# Patient Record
Sex: FEMALE | NOT HISPANIC OR LATINO | URBAN - NONMETROPOLITAN AREA
[De-identification: names, ages, dates, MRNs, and addresses within clinical notes are randomized per-mention and may not be internally consistent; named-entity substitution may affect disease eponyms.]

---

## 2019-07-10 ENCOUNTER — OFFICE VISIT (OUTPATIENT)
Dept: CARDIOLOGY | Facility: PHYSICIAN GROUP | Age: 74
End: 2019-07-10
Payer: COMMERCIAL

## 2019-07-10 VITALS
OXYGEN SATURATION: 99 % | HEIGHT: 61 IN | DIASTOLIC BLOOD PRESSURE: 82 MMHG | BODY MASS INDEX: 19.63 KG/M2 | HEART RATE: 106 BPM | SYSTOLIC BLOOD PRESSURE: 126 MMHG | WEIGHT: 104 LBS

## 2019-07-10 DIAGNOSIS — I48.19 PERSISTENT ATRIAL FIBRILLATION (HCC): ICD-10-CM

## 2019-07-10 DIAGNOSIS — I15.0 RENOVASCULAR HYPERTENSION: ICD-10-CM

## 2019-07-10 DIAGNOSIS — I63.10 CEREBROVASCULAR ACCIDENT (CVA) DUE TO EMBOLISM OF PRECEREBRAL ARTERY (HCC): ICD-10-CM

## 2019-07-10 DIAGNOSIS — Z79.01 ANTICOAGULATED: ICD-10-CM

## 2019-07-10 PROBLEM — I48.91 AF (ATRIAL FIBRILLATION) (HCC): Status: ACTIVE | Noted: 2019-07-10

## 2019-07-10 PROCEDURE — 99204 OFFICE O/P NEW MOD 45 MIN: CPT | Performed by: INTERNAL MEDICINE

## 2019-07-10 RX ORDER — DILTIAZEM HYDROCHLORIDE 180 MG/1
CAPSULE, EXTENDED RELEASE ORAL
COMMUNITY
Start: 2019-06-17

## 2019-07-10 RX ORDER — RIVAROXABAN 20 MG/1
TABLET, FILM COATED ORAL
COMMUNITY
Start: 2019-06-11

## 2019-07-10 ASSESSMENT — ENCOUNTER SYMPTOMS
INSOMNIA: 0
HEADACHES: 1
SHORTNESS OF BREATH: 0
DIZZINESS: 0
NERVOUS/ANXIOUS: 0
TREMORS: 0
MYALGIAS: 0
WHEEZING: 0
COUGH: 0
SPEECH CHANGE: 1
EYE PAIN: 0
VOMITING: 0
LOSS OF CONSCIOUSNESS: 0
ABDOMINAL PAIN: 0
TINGLING: 0
CHILLS: 0
NAUSEA: 0
FEVER: 0
BRUISES/BLEEDS EASILY: 0
DEPRESSION: 0
BLURRED VISION: 0
SENSORY CHANGE: 0
PALPITATIONS: 0
EYE DISCHARGE: 0

## 2019-07-10 NOTE — LETTER
[facility] [address]  Phone: [phone]  Fax: [phone]              Azeb Childers  1945    Encounter Date: 7/10/2019    Essie Jain M.D.          PROGRESS NOTE:  Chief Complaint   Patient presents with   • Hypertension       Subjective:   Azeb Childers is a 74 y.o. female who presents today as a new patient.  She is sent in consultation in regards to her permanent atrial fibrillation and hypertension by her primary doctor, Dr. Sorto.    She is accompanied by her .  She is moved from [place] to be closer to her  who works in [place] at the [employer] as an .  They are very excited to be her together    Last year she had a stroke.  At this time it was realized that she had high blood pressure and atrial fibrillation.  She states she did not have a very good relationship with her prior doctor and is not sure if he had told her she had atrial fibrillation before.  In any event they think that sometimes her stroke has caused difficulty with her memory and number finding skills.  She is not sure if the infrequent headaches she gets near her temples is caused by this.  Her acupuncturist really helps her anxiety and her symptoms    Compliant on her medications including her anti-coagulation.  No bleeding issues.  States they had an echo done last year when she was diagnosed with a stroke.  They think the heart function was normal    Medical history is reviewed and stated that the problem list and above  History reviewed. No pertinent surgical history.  Family History   Problem Relation Age of Onset   • Heart Disease Father      Social History     Social History   • Marital status: Unknown     Spouse name: N/A   • Number of children: N/A   • Years of education: N/A     Occupational History   • Not on file.     Social History Main Topics   • Smoking status: Never Smoker   • Smokeless  "tobacco: Never Used   • Alcohol use No   • Drug use: No   • Sexual activity: Not on file     Other Topics Concern   • Not on file     Social History Narrative   • No narrative on file     No Known Allergies  Outpatient Encounter Prescriptions as of 7/10/2019   Medication Sig Dispense Refill   • DILT- MG XR capsule      • XARELTO 20 MG Tab tablet        No facility-administered encounter medications on file as of 7/10/2019.      Review of Systems   Constitutional: Negative for chills and fever.   HENT: Negative for congestion and hearing loss.    Eyes: Negative for blurred vision, pain and discharge.   Respiratory: Negative for cough, shortness of breath and wheezing.    Cardiovascular: Negative for chest pain, palpitations and leg swelling.   Gastrointestinal: Negative for abdominal pain, nausea and vomiting.   Genitourinary: Negative for dysuria and urgency.   Musculoskeletal: Negative for joint pain and myalgias.   Skin: Negative for itching and rash.   Neurological: Positive for speech change and headaches. Negative for dizziness, tingling, tremors, sensory change and loss of consciousness.   Endo/Heme/Allergies: Negative for environmental allergies. Does not bruise/bleed easily.   Psychiatric/Behavioral: Negative for depression. The patient is not nervous/anxious and does not have insomnia.    All other systems reviewed and are negative.       Objective:   /82 (BP Location: Left arm, Patient Position: Sitting, BP Cuff Size: Adult)   Pulse (!) 106   Ht 1.549 m (5' 1\")   Wt 47.2 kg (104 lb)   SpO2 99%   BMI 19.65 kg/m²      Physical Exam   Constitutional: She is oriented to person, place, and time. She appears well-developed and well-nourished.   HENT:   Head: Normocephalic and atraumatic.   Eyes: Pupils are equal, round, and reactive to light. EOM are normal. No scleral icterus.   Neck: No JVD present. No thyromegaly present.   Cardiovascular: Normal rate and intact distal pulses.    No murmur " heard.  Pulmonary/Chest: Effort normal and breath sounds normal.   Abdominal: Bowel sounds are normal. She exhibits no distension.   Musculoskeletal: She exhibits no edema or tenderness.   Lymphadenopathy:     She has no cervical adenopathy.   Neurological: She is alert and oriented to person, place, and time. No cranial nerve deficit. She exhibits normal muscle tone. Coordination normal.   Skin: Skin is warm and dry.   Psychiatric: She has a normal mood and affect. Her behavior is normal.       Assessment:     1. Renovascular hypertension     2. Persistent atrial fibrillation (HCC)  CBC WITH DIFFERENTIAL    Comp Metabolic Panel    LIPID PANEL   3. Anticoagulated     4. Cerebrovascular accident (CVA) due to embolism of precerebral artery (HCC)         Medical Decision Making:  Today's Assessment / Status / Plan:     Twelve-lead EKG done and read by me shows rate controlled atrial fibrillation, rates in the 90s to 100s.  No ischemic changes narrow QRS    Atrial fibrillation  Talked about pathophysiology especially with stroke  Lifelong anticoagulation, with her Xarelto they will not be a bridge warranted at least for short time  I have no lab work and we talked about annually checking a CBC as well as kidney function  Medicine is affordable, discussed  I requested records from [place], [place] in 2020 if not done    Hypertension  Very strict control the setting of stroke  Excellent today with calcium channel blocker, discussed goals and guidelines  I did order lipids as well as a CMP    Stroke history  As above, discussed at length    RTC 1 year sooner if lab work necessitates      Beltran Sorto M.D.  [address] [facility] [address]  VIA Facsimile: [phone]

## 2019-07-10 NOTE — PROGRESS NOTES
Chief Complaint   Patient presents with   • Hypertension       Subjective:   Azeb Childers is a 74 y.o. female who presents today as a new patient.  She is sent in consultation in regards to her permanent atrial fibrillation and hypertension by her primary doctor, Dr. Sorto.    She is accompanied by her .  She is moved from [place] to be closer to her  who works in [place] at the [employer] base as an .  They are very excited to be her together    Last year she had a stroke.  At this time it was realized that she had high blood pressure and atrial fibrillation.  She states she did not have a very good relationship with her prior doctor and is not sure if he had told her she had atrial fibrillation before.  In any event they think that sometimes her stroke has caused difficulty with her memory and number finding skills.  She is not sure if the infrequent headaches she gets near her temples is caused by this.  Her acupuncturist really helps her anxiety and her symptoms    Compliant on her medications including her anti-coagulation.  No bleeding issues.  States they had an echo done last year when she was diagnosed with a stroke.  They think the heart function was normal    Medical history is reviewed and stated that the problem list and above  History reviewed. No pertinent surgical history.  Family History   Problem Relation Age of Onset   • Heart Disease Father      Social History     Social History   • Marital status: Unknown     Spouse name: N/A   • Number of children: N/A   • Years of education: N/A     Occupational History   • Not on file.     Social History Main Topics   • Smoking status: Never Smoker   • Smokeless tobacco: Never Used   • Alcohol use No   • Drug use: No   • Sexual activity: Not on file     Other Topics Concern   • Not on file     Social History Narrative   • No narrative on file     No Known Allergies  Outpatient Encounter Prescriptions as of 7/10/2019   Medication Sig  "Dispense Refill   • DILT- MG XR capsule      • XARELTO 20 MG Tab tablet        No facility-administered encounter medications on file as of 7/10/2019.      Review of Systems   Constitutional: Negative for chills and fever.   HENT: Negative for congestion and hearing loss.    Eyes: Negative for blurred vision, pain and discharge.   Respiratory: Negative for cough, shortness of breath and wheezing.    Cardiovascular: Negative for chest pain, palpitations and leg swelling.   Gastrointestinal: Negative for abdominal pain, nausea and vomiting.   Genitourinary: Negative for dysuria and urgency.   Musculoskeletal: Negative for joint pain and myalgias.   Skin: Negative for itching and rash.   Neurological: Positive for speech change and headaches. Negative for dizziness, tingling, tremors, sensory change and loss of consciousness.   Endo/Heme/Allergies: Negative for environmental allergies. Does not bruise/bleed easily.   Psychiatric/Behavioral: Negative for depression. The patient is not nervous/anxious and does not have insomnia.    All other systems reviewed and are negative.       Objective:   /82 (BP Location: Left arm, Patient Position: Sitting, BP Cuff Size: Adult)   Pulse (!) 106   Ht 1.549 m (5' 1\")   Wt 47.2 kg (104 lb)   SpO2 99%   BMI 19.65 kg/m²     Physical Exam   Constitutional: She is oriented to person, place, and time. She appears well-developed and well-nourished.   HENT:   Head: Normocephalic and atraumatic.   Eyes: Pupils are equal, round, and reactive to light. EOM are normal. No scleral icterus.   Neck: No JVD present. No thyromegaly present.   Cardiovascular: Normal rate and intact distal pulses.    No murmur heard.  Pulmonary/Chest: Effort normal and breath sounds normal.   Abdominal: Bowel sounds are normal. She exhibits no distension.   Musculoskeletal: She exhibits no edema or tenderness.   Lymphadenopathy:     She has no cervical adenopathy.   Neurological: She is alert and " oriented to person, place, and time. No cranial nerve deficit. She exhibits normal muscle tone. Coordination normal.   Skin: Skin is warm and dry.   Psychiatric: She has a normal mood and affect. Her behavior is normal.       Assessment:     1. Renovascular hypertension     2. Persistent atrial fibrillation (HCC)  CBC WITH DIFFERENTIAL    Comp Metabolic Panel    LIPID PANEL   3. Anticoagulated     4. Cerebrovascular accident (CVA) due to embolism of precerebral artery (HCC)         Medical Decision Making:  Today's Assessment / Status / Plan:     Twelve-lead EKG done and read by me shows rate controlled atrial fibrillation, rates in the 90s to 100s.  No ischemic changes narrow QRS    Atrial fibrillation  Talked about pathophysiology especially with stroke  Lifelong anticoagulation, with her Xarelto they will not be a bridge warranted at least for short time  I have no lab work and we talked about annually checking a CBC as well as kidney function  Medicine is affordable, discussed  I requested records from [place], echo in 2020 if not done    Hypertension  Very strict control the setting of stroke  Excellent today with calcium channel blocker, discussed goals and guidelines  I did order lipids as well as a CMP    Stroke history  As above, discussed at length    RTC 1 year sooner if lab work necessitates

## 2020-04-20 ENCOUNTER — TELEPHONE (OUTPATIENT)
Dept: CARDIOLOGY | Facility: MEDICAL CENTER | Age: 75
End: 2020-04-20